# Patient Record
Sex: FEMALE | Race: WHITE | Employment: FULL TIME | URBAN - METROPOLITAN AREA
[De-identification: names, ages, dates, MRNs, and addresses within clinical notes are randomized per-mention and may not be internally consistent; named-entity substitution may affect disease eponyms.]

---

## 2023-01-30 ENCOUNTER — APPOINTMENT (OUTPATIENT)
Dept: GENERAL RADIOLOGY | Age: 38
End: 2023-01-30
Attending: STUDENT IN AN ORGANIZED HEALTH CARE EDUCATION/TRAINING PROGRAM
Payer: COMMERCIAL

## 2023-01-30 ENCOUNTER — APPOINTMENT (OUTPATIENT)
Dept: CT IMAGING | Age: 38
End: 2023-01-30
Attending: STUDENT IN AN ORGANIZED HEALTH CARE EDUCATION/TRAINING PROGRAM
Payer: COMMERCIAL

## 2023-01-30 ENCOUNTER — HOSPITAL ENCOUNTER (EMERGENCY)
Age: 38
Discharge: HOME | End: 2023-01-30
Attending: STUDENT IN AN ORGANIZED HEALTH CARE EDUCATION/TRAINING PROGRAM
Payer: COMMERCIAL

## 2023-01-30 VITALS
RESPIRATION RATE: 14 BRPM | TEMPERATURE: 98.8 F | SYSTOLIC BLOOD PRESSURE: 120 MMHG | DIASTOLIC BLOOD PRESSURE: 79 MMHG | HEART RATE: 82 BPM | OXYGEN SATURATION: 100 % | BODY MASS INDEX: 19.49 KG/M2 | HEIGHT: 63 IN | WEIGHT: 110 LBS

## 2023-01-30 DIAGNOSIS — M54.2 NECK PAIN: ICD-10-CM

## 2023-01-30 DIAGNOSIS — R00.2 PALPITATIONS: Primary | ICD-10-CM

## 2023-01-30 LAB
ALBUMIN SERPL-MCNC: 3.9 G/DL (ref 3.5–5)
ALBUMIN/GLOB SERPL: 1.1 (ref 1.1–2.2)
ALP SERPL-CCNC: 53 U/L (ref 45–117)
ALT SERPL-CCNC: 38 U/L (ref 12–78)
AMPHET UR QL SCN: NEGATIVE
ANION GAP SERPL CALC-SCNC: 10 MMOL/L (ref 5–15)
APPEARANCE UR: ABNORMAL
AST SERPL-CCNC: 27 U/L (ref 15–37)
BACTERIA URNS QL MICRO: NEGATIVE /HPF
BARBITURATES UR QL SCN: NEGATIVE
BASOPHILS # BLD: 0 K/UL (ref 0–0.1)
BASOPHILS NFR BLD: 1 % (ref 0–1)
BENZODIAZ UR QL: NEGATIVE
BILIRUB SERPL-MCNC: 0.3 MG/DL (ref 0.2–1)
BILIRUB UR QL: NEGATIVE
BNP SERPL-MCNC: 62 PG/ML (ref 0–125)
BUN SERPL-MCNC: 13 MG/DL (ref 6–20)
BUN/CREAT SERPL: 14 (ref 12–20)
CALCIUM SERPL-MCNC: 8.7 MG/DL (ref 8.5–10.1)
CANNABINOIDS UR QL SCN: NEGATIVE
CHLORIDE SERPL-SCNC: 105 MMOL/L (ref 97–108)
CO2 SERPL-SCNC: 26 MMOL/L (ref 21–32)
COCAINE UR QL SCN: NEGATIVE
COLOR UR: ABNORMAL
CREAT SERPL-MCNC: 0.93 MG/DL (ref 0.55–1.02)
DIFFERENTIAL METHOD BLD: NORMAL
DRUG SCRN COMMENT,DRGCM: NORMAL
EOSINOPHIL # BLD: 0.1 K/UL (ref 0–0.4)
EOSINOPHIL NFR BLD: 1 % (ref 0–7)
EPITH CASTS URNS QL MICRO: ABNORMAL /LPF
ERYTHROCYTE [DISTWIDTH] IN BLOOD BY AUTOMATED COUNT: 13.2 % (ref 11.5–14.5)
GLOBULIN SER CALC-MCNC: 3.7 G/DL (ref 2–4)
GLUCOSE BLD STRIP.AUTO-MCNC: 97 MG/DL (ref 65–117)
GLUCOSE SERPL-MCNC: 94 MG/DL (ref 65–100)
GLUCOSE UR STRIP.AUTO-MCNC: NEGATIVE MG/DL
HCG UR QL: NEGATIVE
HCT VFR BLD AUTO: 42.1 % (ref 35–47)
HGB BLD-MCNC: 13.7 G/DL (ref 11.5–16)
HGB UR QL STRIP: NEGATIVE
IMM GRANULOCYTES # BLD AUTO: 0 K/UL (ref 0–0.04)
IMM GRANULOCYTES NFR BLD AUTO: 0 % (ref 0–0.5)
KETONES UR QL STRIP.AUTO: NEGATIVE MG/DL
LEUKOCYTE ESTERASE UR QL STRIP.AUTO: NEGATIVE
LYMPHOCYTES # BLD: 1.7 K/UL (ref 0.8–3.5)
LYMPHOCYTES NFR BLD: 24 % (ref 12–49)
MAGNESIUM SERPL-MCNC: 1.8 MG/DL (ref 1.6–2.4)
MCH RBC QN AUTO: 26.6 PG (ref 26–34)
MCHC RBC AUTO-ENTMCNC: 32.5 G/DL (ref 30–36.5)
MCV RBC AUTO: 81.6 FL (ref 80–99)
METHADONE UR QL: NEGATIVE
MONOCYTES # BLD: 0.6 K/UL (ref 0–1)
MONOCYTES NFR BLD: 8 % (ref 5–13)
NEUTS SEG # BLD: 4.8 K/UL (ref 1.8–8)
NEUTS SEG NFR BLD: 67 % (ref 32–75)
NITRITE UR QL STRIP.AUTO: NEGATIVE
NRBC # BLD: 0 K/UL (ref 0–0.01)
NRBC BLD-RTO: 0 PER 100 WBC
OPIATES UR QL: NEGATIVE
PCP UR QL: NEGATIVE
PH UR STRIP: 7.5 (ref 5–8)
PLATELET # BLD AUTO: 234 K/UL (ref 150–400)
PMV BLD AUTO: 11 FL (ref 8.9–12.9)
POTASSIUM SERPL-SCNC: 3.6 MMOL/L (ref 3.5–5.1)
PROT SERPL-MCNC: 7.6 G/DL (ref 6.4–8.2)
PROT UR STRIP-MCNC: NEGATIVE MG/DL
RBC # BLD AUTO: 5.16 M/UL (ref 3.8–5.2)
RBC #/AREA URNS HPF: ABNORMAL /HPF (ref 0–5)
SERVICE CMNT-IMP: NORMAL
SODIUM SERPL-SCNC: 141 MMOL/L (ref 136–145)
SP GR UR REFRACTOMETRY: 1.01 (ref 1–1.03)
TROPONIN-HIGH SENSITIVITY: 10 NG/L (ref 0–51)
TSH SERPL DL<=0.05 MIU/L-ACNC: 1.39 UIU/ML (ref 0.36–3.74)
UR CULT HOLD, URHOLD: NORMAL
UROBILINOGEN UR QL STRIP.AUTO: 0.2 EU/DL (ref 0.2–1)
WBC # BLD AUTO: 7.2 K/UL (ref 3.6–11)
WBC URNS QL MICRO: ABNORMAL /HPF (ref 0–4)

## 2023-01-30 PROCEDURE — 82962 GLUCOSE BLOOD TEST: CPT

## 2023-01-30 PROCEDURE — 83880 ASSAY OF NATRIURETIC PEPTIDE: CPT

## 2023-01-30 PROCEDURE — 80053 COMPREHEN METABOLIC PANEL: CPT

## 2023-01-30 PROCEDURE — 99285 EMERGENCY DEPT VISIT HI MDM: CPT

## 2023-01-30 PROCEDURE — 93005 ELECTROCARDIOGRAM TRACING: CPT

## 2023-01-30 PROCEDURE — 84484 ASSAY OF TROPONIN QUANT: CPT

## 2023-01-30 PROCEDURE — 70450 CT HEAD/BRAIN W/O DYE: CPT

## 2023-01-30 PROCEDURE — 80307 DRUG TEST PRSMV CHEM ANLYZR: CPT

## 2023-01-30 PROCEDURE — 71045 X-RAY EXAM CHEST 1 VIEW: CPT

## 2023-01-30 PROCEDURE — 36415 COLL VENOUS BLD VENIPUNCTURE: CPT

## 2023-01-30 PROCEDURE — 81025 URINE PREGNANCY TEST: CPT

## 2023-01-30 PROCEDURE — 83735 ASSAY OF MAGNESIUM: CPT

## 2023-01-30 PROCEDURE — 72125 CT NECK SPINE W/O DYE: CPT

## 2023-01-30 PROCEDURE — 84443 ASSAY THYROID STIM HORMONE: CPT

## 2023-01-30 PROCEDURE — 81001 URINALYSIS AUTO W/SCOPE: CPT

## 2023-01-30 PROCEDURE — 85025 COMPLETE CBC W/AUTO DIFF WBC: CPT

## 2023-01-30 NOTE — ED NOTES
C/o right side of the neck pain for over 1 year,\"It feels like bump\" Also has a sore area in right shoulder area

## 2023-01-30 NOTE — DISCHARGE INSTRUCTIONS
The exact cause of your symptoms today are not clear however your cardiac enzymes, blood levels, electrolytes, EKG, CT scan of the head and chest x-ray are within normal limits. Vital signs have also likewise been normal.  Please follow-up with your primary care, they may recommend further testing including a \"culture\" monitor (an outpatient cardiac rhythm monitor). If you have new or worsening symptoms please not hesitate to return for further evaluation.

## 2023-01-30 NOTE — ED PROVIDER NOTES
Greg Monson is a 40 y.o. female with past medical history notable for none presenting with a concerning episode of rapid heartbeat, headache, palpitations and head pressure, pressure over bilateral wrists and a feeling of near fainting while driving. She had a sensation of rapid heartbeat while she was at work and afterward when she left to  her children from school she developed rapid heartbeat although did not cough her smart watch in order to relieve some of the sensation of pressure from her wrist.  She not have fevers or chills. The symptoms have essentially resolved other than some mild palpitations at this time. She denies any preceding symptoms. She does not take any prescribed occasions or over-the-counter it. She does not drink soda and only drinks a few coffees a day. She denies anything recently. Denies anxiety or recent stressful events. .         Chest Pain (Angina)   Associated symptoms include nausea and palpitations. Pertinent negatives include no abdominal pain, no back pain, no fever, no headaches, no shortness of breath and no vomiting. Palpitations   Associated symptoms include chest pain and nausea. Pertinent negatives include no fever, no abdominal pain, no vomiting, no headaches, no back pain and no shortness of breath. History reviewed. No pertinent past medical history. Past Surgical History:   Procedure Laterality Date    HX GYN           History reviewed. No pertinent family history.     Social History     Socioeconomic History    Marital status:      Spouse name: Not on file    Number of children: Not on file    Years of education: Not on file    Highest education level: Not on file   Occupational History    Not on file   Tobacco Use    Smoking status: Never    Smokeless tobacco: Never   Substance and Sexual Activity    Alcohol use: Yes     Comment: social    Drug use: Never    Sexual activity: Not on file   Other Topics Concern    Not on file   Social History Narrative    Not on file     Social Determinants of Health     Financial Resource Strain: Not on file   Food Insecurity: Not on file   Transportation Needs: Not on file   Physical Activity: Not on file   Stress: Not on file   Social Connections: Not on file   Intimate Partner Violence: Not on file   Housing Stability: Not on file         ALLERGIES: Patient has no known allergies. Review of Systems   Constitutional:  Positive for fatigue. Negative for chills and fever. Eyes:  Negative for photophobia. Respiratory:  Negative for shortness of breath. Cardiovascular:  Positive for chest pain and palpitations. Gastrointestinal:  Positive for nausea. Negative for abdominal pain and vomiting. Genitourinary:  Negative for dysuria. Musculoskeletal:  Negative for back pain. Neurological:  Negative for syncope, light-headedness and headaches. Psychiatric/Behavioral:  Negative for confusion and self-injury. All other systems reviewed and are negative. Vitals:    01/30/23 1602 01/30/23 1612 01/30/23 1632 01/30/23 1657   BP: 134/79  123/77 120/79   Pulse: 85  77 82   Resp: 16  16 14   Temp: 98.8 °F (37.1 °C)      SpO2: 100%  100% 100%   Weight:  49.9 kg (110 lb)     Height:  5' 3\" (1.6 m)              Physical Exam  Constitutional:       General: She is not in acute distress. Appearance: She is not ill-appearing or toxic-appearing. HENT:      Head: Normocephalic and atraumatic. Nose: Nose normal.      Mouth/Throat:      Mouth: Mucous membranes are moist.   Eyes:      Extraocular Movements: Extraocular movements intact. Pupils: Pupils are equal, round, and reactive to light. Neck:     Cardiovascular:      Rate and Rhythm: Normal rate and regular rhythm. Pulses: Normal pulses. Heart sounds: Normal heart sounds. Pulmonary:      Effort: Pulmonary effort is normal. No respiratory distress. Breath sounds: Normal breath sounds. No stridor.    Abdominal:      General: Abdomen is flat. There is no distension. Palpations: Abdomen is soft. Tenderness: There is no abdominal tenderness. Musculoskeletal:         General: Normal range of motion. Cervical back: Normal range of motion. Right lower leg: No edema. Left lower leg: No edema. Skin:     General: Skin is warm. Capillary Refill: Capillary refill takes less than 2 seconds. Neurological:      General: No focal deficit present. Mental Status: She is alert and oriented to person, place, and time. Psychiatric:         Mood and Affect: Mood normal.         Behavior: Behavior normal.        Medical Decision Making  Amount and/or Complexity of Data Reviewed  Labs: ordered. Radiology: ordered. ECG/medicine tests: ordered. Patient had an episode of palpitations and headache earlier today. There is no evidence of thyroid, electrolyte, rhythm disturbance. There is no evidence of an infection. CT of the head is negative for hemorrhage. Overall she appears well. The exact etiology is not clear. She was still symptomatic and her EKG was being performed which argues somewhat against arrhythmias being the primary etiology of her symptoms. Benefit from outpatient Holter monitor. PROGRESS NOTE:  7:40 PM  The patient has been re-evaluated and are stable for discharge. All available radiology and laboratory results have been reviewed with patient and/or available family. Patient and/or family verbally conveyed their understanding and agreement of the patient's signs, symptoms, diagnosis, treatment and prognosis and additionally agree to follow-up as recommended in the discharge instructions or to return to the Emergency Department should their condition change or worsen prior to their follow-up appointment. All questions have been answered and patient and/or available family who express understanding.       LABORATORY RESULTS:  Labs Reviewed   URINALYSIS W/MICROSCOPIC - Abnormal; Notable for the following components:       Result Value    Appearance HAZY (*)     All other components within normal limits   URINE CULTURE HOLD SAMPLE   CBC WITH AUTOMATED DIFF   MAGNESIUM   METABOLIC PANEL, COMPREHENSIVE   NT-PRO BNP   TROPONIN-HIGH SENSITIVITY   TSH 3RD GENERATION   DRUG SCREEN, URINE   SAMPLES BEING HELD   GLUCOSE, POC   HCG URINE, QL. - POC       IMAGING RESULTS:  CT SPINE CERV WO CONT   Final Result      1. No acute osseous abnormality. 2. No suspicious soft tissue mass along the posterior cervical spine. XR CHEST PORT   Final Result      No acute process on portable chest.         CT HEAD WO CONT   Final Result   No acute intracranial finding. MEDICATIONS GIVEN:  Medications - No data to display    IMPRESSION:  1. Palpitations    2. Neck pain        PLAN:  Follow-up Information       Follow up With Specialties Details Why [address] [place] [address] Schedule an appointment as soon as possible for a visit in 3 days Call for a follow up appointment. May benefit from a home cardiac monitor to monitor for transient arrythmia [address] , [facility] [address]  [phone]           There are no discharge medications for this patient. Remy Ace MD      Please note that this dictation was completed with [employer], the computer voice recognition software. Quite often unanticipated grammatical, syntax, homophones, and other interpretive errors are inadvertently transcribed by the computer software. Please disregard these errors. Please excuse any errors that have escaped final proofreading.              Procedures

## 2023-01-30 NOTE — LETTER
[facility] EMERGENCY DEPT    Date: 1/30/2023    Regarding: Jayme Lama    MRN: [id number]    YOB: 1985    Dear Moon Juarez MD,    Your patient, Jayme Lmaa was seen in the Emergency Department on 1/30/2023. Attached to this letter is a full report on that visit. Please do not hesitate to contact me if you have any questions or concerns.     Sincerely,    Noemi Vega MD

## 2023-01-30 NOTE — ED TRIAGE NOTES
Pt c/o chest pressure, fast heartbeat and heaviness, +shortness of breath, diaphoretic, denied nausea. Pain 8/10; pt was driving when the chest heaviness started and pt felt \" all of a sudden felt like my body was gonna faint, like my legs and arms were gonna give out, felt like it came from my feet up to my head; vision blurry in both eyes. speech clear, tongue midline, face symmetrical,  equal, no drift noted in upper and lower extremities. PEERL. Pt is alert and oriented x 4. Pt now reports left arm feels like pins and needles;  Pt states \" I may have a pinched nerve in my neck\" BGL 97

## 2023-02-01 LAB
ATRIAL RATE: 90 BPM
CALCULATED P AXIS, ECG09: 75 DEGREES
CALCULATED R AXIS, ECG10: 69 DEGREES
CALCULATED T AXIS, ECG11: 31 DEGREES
DIAGNOSIS, 93000: NORMAL
P-R INTERVAL, ECG05: 130 MS
Q-T INTERVAL, ECG07: 356 MS
QRS DURATION, ECG06: 82 MS
QTC CALCULATION (BEZET), ECG08: 435 MS
VENTRICULAR RATE, ECG03: 90 BPM

## 2023-02-06 ENCOUNTER — APPOINTMENT (OUTPATIENT)
Dept: GENERAL RADIOLOGY | Age: 38
End: 2023-02-06
Attending: EMERGENCY MEDICINE
Payer: COMMERCIAL

## 2023-02-06 ENCOUNTER — HOSPITAL ENCOUNTER (EMERGENCY)
Age: 38
Discharge: HOME | End: 2023-02-06
Attending: EMERGENCY MEDICINE
Payer: COMMERCIAL

## 2023-02-06 VITALS
HEART RATE: 66 BPM | HEIGHT: 63 IN | SYSTOLIC BLOOD PRESSURE: 115 MMHG | WEIGHT: 116.4 LBS | BODY MASS INDEX: 20.62 KG/M2 | OXYGEN SATURATION: 100 % | TEMPERATURE: 98.6 F | DIASTOLIC BLOOD PRESSURE: 78 MMHG | RESPIRATION RATE: 15 BRPM

## 2023-02-06 DIAGNOSIS — R07.9 CHEST PAIN, UNSPECIFIED TYPE: ICD-10-CM

## 2023-02-06 DIAGNOSIS — R20.2 PARESTHESIA OF ARM: Primary | ICD-10-CM

## 2023-02-06 LAB
ALBUMIN SERPL-MCNC: 3.5 G/DL (ref 3.5–5)
ALBUMIN/GLOB SERPL: 1.1 (ref 1.1–2.2)
ALP SERPL-CCNC: 44 U/L (ref 45–117)
ALT SERPL-CCNC: 28 U/L (ref 12–78)
ANION GAP SERPL CALC-SCNC: 10 MMOL/L (ref 5–15)
AST SERPL-CCNC: 17 U/L (ref 15–37)
ATRIAL RATE: 79 BPM
BASOPHILS # BLD: 0 K/UL (ref 0–0.1)
BASOPHILS NFR BLD: 1 % (ref 0–1)
BILIRUB SERPL-MCNC: 0.6 MG/DL (ref 0.2–1)
BUN SERPL-MCNC: 14 MG/DL (ref 6–20)
BUN/CREAT SERPL: 19 (ref 12–20)
CALCIUM SERPL-MCNC: 8.3 MG/DL (ref 8.5–10.1)
CALCULATED P AXIS, ECG09: 82 DEGREES
CALCULATED R AXIS, ECG10: 74 DEGREES
CALCULATED T AXIS, ECG11: 61 DEGREES
CHLORIDE SERPL-SCNC: 102 MMOL/L (ref 97–108)
CO2 SERPL-SCNC: 27 MMOL/L (ref 21–32)
COMMENT, HOLDF: NORMAL
COMMENT, HOLDF: NORMAL
CREAT SERPL-MCNC: 0.75 MG/DL (ref 0.55–1.02)
D DIMER PPP FEU-MCNC: 0.24 MG/L FEU (ref 0–0.65)
DIAGNOSIS, 93000: NORMAL
DIFFERENTIAL METHOD BLD: NORMAL
EOSINOPHIL # BLD: 0.1 K/UL (ref 0–0.4)
EOSINOPHIL NFR BLD: 1 % (ref 0–7)
ERYTHROCYTE [DISTWIDTH] IN BLOOD BY AUTOMATED COUNT: 13.2 % (ref 11.5–14.5)
GLOBULIN SER CALC-MCNC: 3.3 G/DL (ref 2–4)
GLUCOSE SERPL-MCNC: 101 MG/DL (ref 65–100)
HCG UR QL: NEGATIVE
HCT VFR BLD AUTO: 41.1 % (ref 35–47)
HGB BLD-MCNC: 13.7 G/DL (ref 11.5–16)
IMM GRANULOCYTES # BLD AUTO: 0 K/UL (ref 0–0.04)
IMM GRANULOCYTES NFR BLD AUTO: 0 % (ref 0–0.5)
LYMPHOCYTES # BLD: 1.7 K/UL (ref 0.8–3.5)
LYMPHOCYTES NFR BLD: 32 % (ref 12–49)
MCH RBC QN AUTO: 26.8 PG (ref 26–34)
MCHC RBC AUTO-ENTMCNC: 33.3 G/DL (ref 30–36.5)
MCV RBC AUTO: 80.3 FL (ref 80–99)
MONOCYTES # BLD: 0.6 K/UL (ref 0–1)
MONOCYTES NFR BLD: 11 % (ref 5–13)
NEUTS SEG # BLD: 2.8 K/UL (ref 1.8–8)
NEUTS SEG NFR BLD: 55 % (ref 32–75)
NRBC # BLD: 0 K/UL (ref 0–0.01)
NRBC BLD-RTO: 0 PER 100 WBC
P-R INTERVAL, ECG05: 144 MS
PLATELET # BLD AUTO: 191 K/UL (ref 150–400)
PMV BLD AUTO: 11.2 FL (ref 8.9–12.9)
POTASSIUM SERPL-SCNC: 3.7 MMOL/L (ref 3.5–5.1)
PROT SERPL-MCNC: 6.8 G/DL (ref 6.4–8.2)
Q-T INTERVAL, ECG07: 374 MS
QRS DURATION, ECG06: 86 MS
QTC CALCULATION (BEZET), ECG08: 428 MS
RBC # BLD AUTO: 5.12 M/UL (ref 3.8–5.2)
SAMPLES BEING HELD,HOLD: NORMAL
SAMPLES BEING HELD,HOLD: NORMAL
SODIUM SERPL-SCNC: 139 MMOL/L (ref 136–145)
TROPONIN I SERPL HS-MCNC: 8 NG/L (ref 0–51)
VENTRICULAR RATE, ECG03: 79 BPM
WBC # BLD AUTO: 5.2 K/UL (ref 3.6–11)

## 2023-02-06 PROCEDURE — 99285 EMERGENCY DEPT VISIT HI MDM: CPT

## 2023-02-06 PROCEDURE — 84484 ASSAY OF TROPONIN QUANT: CPT

## 2023-02-06 PROCEDURE — 85379 FIBRIN DEGRADATION QUANT: CPT

## 2023-02-06 PROCEDURE — 71045 X-RAY EXAM CHEST 1 VIEW: CPT

## 2023-02-06 PROCEDURE — 74011250636 HC RX REV CODE- 250/636: Performed by: EMERGENCY MEDICINE

## 2023-02-06 PROCEDURE — 85025 COMPLETE CBC W/AUTO DIFF WBC: CPT

## 2023-02-06 PROCEDURE — 36415 COLL VENOUS BLD VENIPUNCTURE: CPT

## 2023-02-06 PROCEDURE — 93005 ELECTROCARDIOGRAM TRACING: CPT

## 2023-02-06 PROCEDURE — 96374 THER/PROPH/DIAG INJ IV PUSH: CPT

## 2023-02-06 PROCEDURE — 80053 COMPREHEN METABOLIC PANEL: CPT

## 2023-02-06 PROCEDURE — 81025 URINE PREGNANCY TEST: CPT

## 2023-02-06 RX ORDER — KETOROLAC TROMETHAMINE 30 MG/ML
30 INJECTION, SOLUTION INTRAMUSCULAR; INTRAVENOUS
Status: COMPLETED | OUTPATIENT
Start: 2023-02-06 | End: 2023-02-06

## 2023-02-06 RX ORDER — IBUPROFEN 600 MG/1
600 TABLET ORAL
Qty: 20 TABLET | Refills: 0 | Status: SHIPPED | OUTPATIENT
Start: 2023-02-06

## 2023-02-06 RX ADMIN — KETOROLAC TROMETHAMINE 30 MG: 30 INJECTION, SOLUTION INTRAMUSCULAR; INTRAVENOUS at 08:58

## 2023-02-06 NOTE — ED PROVIDER NOTES
37F w/ no pmhx p/w 1wk chest and arm pain. Pt reports 1wk of B/l upper arm pain worse on left w/ intermittent numbness and tingling. Denies any weakness or sensory loss. Also has occasional blurry vision, no vision loss or speech changes. States has neck and back pain \"for a long time\" and saw PCP for this 1wk ago. No LE symptoms. No arm redness, rash, swelling or recent falls or injuries. Pain is worse w/ movement. No F/C, cough, N/V/D. No drugs. Drinks 1 beer per week. Also reports 1wk of mid chest heaviness and SOB. No dizziness, vertigo or syncope. Has felt lightheaded w/ whole body weakness and fatigue. Denies any prior cardiac hx. Was seen here 1wk ago for similar complaints. History reviewed. No pertinent past medical history. Past Surgical History:   Procedure Laterality Date    HX GYN           History reviewed. No pertinent family history. Social History     Socioeconomic History    Marital status:      Spouse name: Not on file    Number of children: Not on file    Years of education: Not on file    Highest education level: Not on file   Occupational History    Not on file   Tobacco Use    Smoking status: Former     Types: Cigarettes     Quit date: 1/1/2013     Years since quitting: 10.1    Smokeless tobacco: Never   Vaping Use    Vaping Use: Never used   Substance and Sexual Activity    Alcohol use: Yes     Comment: social    Drug use: Never    Sexual activity: Not on file   Other Topics Concern    Not on file   Social History Narrative    Not on file     Social Determinants of Health     Financial Resource Strain: Not on file   Food Insecurity: Not on file   Transportation Needs: Not on file   Physical Activity: Not on file   Stress: Not on file   Social Connections: Not on file   Intimate Partner Violence: Not on file   Housing Stability: Not on file         ALLERGIES: Patient has no known allergies.     Review of Systems   Constitutional:  Negative for chills, diaphoresis and fever.   HENT:  Negative for facial swelling, mouth sores, nosebleeds, trouble swallowing and voice change. Eyes:  Negative for pain and visual disturbance. Respiratory:  Positive for shortness of breath. Negative for apnea, cough, choking, wheezing and stridor. Cardiovascular:  Positive for chest pain. Negative for palpitations and leg swelling. Gastrointestinal:  Negative for abdominal distention, abdominal pain, blood in stool, diarrhea, nausea and vomiting. Genitourinary:  Negative for difficulty urinating, dysuria, flank pain, hematuria and pelvic pain. Musculoskeletal:  Positive for back pain, myalgias and neck pain. Negative for joint swelling. Skin:  Negative for color change and rash. Allergic/Immunologic: Negative for immunocompromised state. Neurological:  Positive for light-headedness and numbness. Negative for dizziness, seizures, syncope and speech difficulty. Hematological:  Does not bruise/bleed easily. Psychiatric/Behavioral:  Negative for agitation and behavioral problems. Vitals:    02/06/23 0857 02/06/23 0907 02/06/23 0922 02/06/23 0937   BP:  119/80 115/76 115/78   Pulse: 77 73 68 66   Resp: 16 14 17 15   Temp:       SpO2: 100% 100% 100% 100%   Weight:       Height:                Physical Exam  Vitals and nursing note reviewed. Constitutional:       General: She is not in acute distress. Appearance: Normal appearance. She is not ill-appearing or toxic-appearing. HENT:      Head: Normocephalic and atraumatic. Right Ear: External ear normal.      Left Ear: External ear normal.      Nose: Nose normal.      Mouth/Throat:      Mouth: Mucous membranes are moist.      Pharynx: Oropharynx is clear. No oropharyngeal exudate or posterior oropharyngeal erythema. Eyes:      General: No scleral icterus. Extraocular Movements: Extraocular movements intact. Conjunctiva/sclera: Conjunctivae normal.      Pupils: Pupils are equal, round, and reactive to light. Cardiovascular:      Rate and Rhythm: Normal rate and regular rhythm. Pulses: Normal pulses. Heart sounds: Normal heart sounds. No murmur heard. No friction rub. No gallop. Pulmonary:      Effort: Pulmonary effort is normal. No respiratory distress. Breath sounds: Normal breath sounds. No stridor. No wheezing, rhonchi or rales. Abdominal:      General: There is no distension. Palpations: Abdomen is soft. Tenderness: There is no abdominal tenderness. There is no guarding or rebound. Musculoskeletal:         General: No tenderness or deformity. Normal range of motion. Cervical back: Normal range of motion and neck supple. No rigidity. Right lower leg: No edema. Left lower leg: No edema. Comments: BLE motor/sensory and distal pulses equal/intact  No joint swelling, point tenderness or erythema or edema   Skin:     General: Skin is warm. Capillary Refill: Capillary refill takes less than 2 seconds. Coloration: Skin is not jaundiced. Neurological:      General: No focal deficit present. Mental Status: She is alert. Cranial Nerves: No cranial nerve deficit. Sensory: No sensory deficit. Motor: No weakness. Coordination: Coordination normal.   Psychiatric:         Mood and Affect: Mood normal.         Behavior: Behavior normal.         Thought Content: Thought content normal.         Judgment: Judgment normal.        I personally reviewed and independently interpreted EKG, labs and imaging results.     EKG Interpretation   SR, narrow QRS, nl intervals, no DARREN/STD/TWI    LABORATORY TESTS:  Admission on 02/06/2023, Discharged on 02/06/2023   Component Date Value Ref Range Status    Ventricular Rate 02/06/2023 79  BPM Final    Atrial Rate 02/06/2023 79  BPM Final    P-R Interval 02/06/2023 144  ms Final    QRS Duration 02/06/2023 86  ms Final    Q-T Interval 02/06/2023 374  ms Final    QTC Calculation (Bezet) 02/06/2023 428  ms Final Calculated P Axis 02/06/2023 82  degrees Final    Calculated R Axis 02/06/2023 74  degrees Final    Calculated T Axis 02/06/2023 61  degrees Final    Diagnosis 02/06/2023    Final                    Value:Normal sinus rhythm  Normal ECG  When compared with ECG of 30-JAN-2023 15:52,  ST no longer depressed in Inferior leads  Confirmed by Amanda Gross MD, [id number] ([id number]) on 2/6/2023 10:36:43 AM      SAMPLES BEING HELD 02/06/2023 1SST,1BLUE,1RED   Final    COMMENT 02/06/2023 Add-on orders for these samples will be processed based on acceptable specimen integrity and analyte stability, which may vary by analyte. Final    WBC 02/06/2023 5.2  3.6 - 11.0 K/uL Final    RBC 02/06/2023 5.12  3.80 - 5.20 M/uL Final    HGB 02/06/2023 13.7  11.5 - 16.0 g/dL Final    HCT 02/06/2023 41.1  35.0 - 47.0 % Final    MCV 02/06/2023 80.3  80.0 - 99.0 FL Final    MCH 02/06/2023 26.8  26.0 - 34.0 PG Final    MCHC 02/06/2023 33.3  30.0 - 36.5 g/dL Final    RDW 02/06/2023 13.2  11.5 - 14.5 % Final    PLATELET 22/52/7149 711  150 - 400 K/uL Final    MPV 02/06/2023 11.2  8.9 - 12.9 FL Final    NRBC 02/06/2023 0.0  0.0  WBC Final    ABSOLUTE NRBC 02/06/2023 0.00  0.00 - 0.01 K/uL Final    NEUTROPHILS 02/06/2023 55  32 - 75 % Final    LYMPHOCYTES 02/06/2023 32  12 - 49 % Final    MONOCYTES 02/06/2023 11  5 - 13 % Final    EOSINOPHILS 02/06/2023 1  0 - 7 % Final    BASOPHILS 02/06/2023 1  0 - 1 % Final    IMMATURE GRANULOCYTES 02/06/2023 0  0 - 0.5 % Final    ABS. NEUTROPHILS 02/06/2023 2.8  1.8 - 8.0 K/UL Final    ABS. LYMPHOCYTES 02/06/2023 1.7  0.8 - 3.5 K/UL Final    ABS. MONOCYTES 02/06/2023 0.6  0.0 - 1.0 K/UL Final    ABS. EOSINOPHILS 02/06/2023 0.1  0.0 - 0.4 K/UL Final    ABS. BASOPHILS 02/06/2023 0.0  0.0 - 0.1 K/UL Final    ABS. IMM.  GRANS. 02/06/2023 0.0  0.00 - 0.04 K/UL Final    DF 02/06/2023 AUTOMATED    Final    Sodium 02/06/2023 139  136 - 145 mmol/L Final    Potassium 02/06/2023 3.7  3.5 - 5.1 mmol/L Final    Chloride 02/06/2023 102  97 - 108 mmol/L Final    CO2 02/06/2023 27  21 - 32 mmol/L Final    Anion gap 02/06/2023 10  5 - 15 mmol/L Final    Glucose 02/06/2023 101 (A)  65 - 100 mg/dL Final    BUN 02/06/2023 14  6 - 20 MG/DL Final    Creatinine 02/06/2023 0.75  0.55 - 1.02 MG/DL Final    BUN/Creatinine ratio 02/06/2023 19  12 - 20   Final    eGFR 02/06/2023 >60  >60 ml/min/1.73m2 Final    Comment:      Pediatric calculator link: [employer]Project.at. org/professionals/kdoqi/gfr_calculatorped       These results are not intended for use in patients <25years of age. eGFR results are calculated without a race factor using  the 2021 CKD-EPI equation. Careful clinical correlation is recommended, particularly when comparing to results calculated using previous equations. The CKD-EPI equation is less accurate in patients with extremes of muscle mass, extra-renal metabolism of creatinine, excessive creatine ingestion, or following therapy that affects renal tubular secretion. Calcium 02/06/2023 8.3 (A)  8.5 - 10.1 MG/DL Final    Bilirubin, total 02/06/2023 0.6  0.2 - 1.0 MG/DL Final    ALT (SGPT) 02/06/2023 28  12 - 78 U/L Final    AST (SGOT) 02/06/2023 17  15 - 37 U/L Final    Alk. phosphatase 02/06/2023 44 (A)  45 - 117 U/L Final    Protein, total 02/06/2023 6.8  6.4 - 8.2 g/dL Final    Albumin 02/06/2023 3.5  3.5 - 5.0 g/dL Final    Globulin 02/06/2023 3.3  2.0 - 4.0 g/dL Final    A-G Ratio 02/06/2023 1.1  1.1 - 2.2   Final    Troponin-High Sensitivity 02/06/2023 8  0 - 51 ng/L Final    Comment: A HS troponin value change of (+ or -) 50% or more below the 99th percentile, in a 1/2/3 hr interval represents a significant change. Clinical correlation is recommended. A HS troponin value change of (+ or -) 20% or above the 99th percentile, in a 1/2/3 hr interval represents a significant change. Clinical correlation is recommended.   99th Percentile:   Women: 0-51 ng/L Men:   0-76 ng/L  Patients taking more than 20 mg/day of biotin may have falsely negative results and should not use this test.      D-dimer 02/06/2023 0.24  0.00 - 0.65 mg/L FEU Final    Comment: (NOTE)  The combination of a low pre-test probability based on Wells criteria  and a D-Dimer result below the cutoff value of 0.5 mg/L increases the   negative predictive value for DVT to %. SAMPLES BEING HELD 02/06/2023 NO HOLDS   Final    COMMENT 02/06/2023 Add-on orders for these samples will be processed based on acceptable specimen integrity and analyte stability, which may vary by analyte. Final    Pregnancy test,urine (POC) 02/06/2023 Negative  NEG   Final       IMAGING RESULTS:  XR CHEST PORT   Final Result   No acute intrathoracic process is identified. MEDICATIONS GIVEN:  Medications   ketorolac (TORADOL) injection 30 mg (30 mg IntraVENous Given 2/6/23 0858)       IMPRESSION:  1. Paresthesia of arm    2. Chest pain, unspecified type        PLAN:  - Discharge    Arthur Menon MD      Medical Decision Making  37F w/ no pmhx p/w 1wk chest and b/l arm pain w/ numbness/tingling. Pt well appearing, hemodynamically stable w/o resp distress or hypoxia. Has no focal neuro deficits including B/l motor/sensory and distal pulses intact. Reviewed CT head/cspine from last ED visit which were unremarkable. Regarding chest pain, CXR today neg for infiltrate, edema or pnx. EKG SR w/o ischemia or signs of pericarditis. Trop neg x1. Has no risk factors, HEART score=0, low risk for MACe, doubt ACS. Low risk for PE by lucas teresa neg, no further VTE work up. Labs unremarkable. Regarding UE pain and paresthesias, unlikely CVA. Possibly cervical radiculopathy given hx of chrnoic back/neck pain. I recommended and offered pt an MRI cspine to r/o for acute spinal cord pathology but she declined this and prefers to have this done as an outpatient.  I carefully explained limits of her previous CT vickey and missing a potentially serious dx including spinal cord lesions and the potential consequences of this but she still declined to have MRI performed. Not taking anything for pain so will start on nsaids. Patient given specific return precautions and explained signs/symptoms for which to come back to ED immediately but otherwise advised to f/u w/ PCP over next 2-3days. Amount and/or Complexity of Data Reviewed  External Data Reviewed: notes. Labs: ordered. Decision-making details documented in ED Course. Radiology: ordered and independent interpretation performed. Decision-making details documented in ED Course. ECG/medicine tests: ordered and independent interpretation performed. Decision-making details documented in ED Course. Risk  Prescription drug management.            Procedures

## 2023-02-06 NOTE — ED TRIAGE NOTES
Pt here with a week of left arm pain that became severe last night and shortness of breath. Pt seen for the same last week here and had negative work-up. Followed up with PCP and has an appointment with cardiology tomorrow. Pt reports intermittent numbness to left side of face also. Pt states that she was afraid that she was not going to wake up this morning.